# Patient Record
Sex: MALE | Race: WHITE | NOT HISPANIC OR LATINO | Employment: FULL TIME | URBAN - METROPOLITAN AREA
[De-identification: names, ages, dates, MRNs, and addresses within clinical notes are randomized per-mention and may not be internally consistent; named-entity substitution may affect disease eponyms.]

---

## 2020-12-20 ENCOUNTER — HOSPITAL ENCOUNTER (EMERGENCY)
Facility: HOSPITAL | Age: 45
Discharge: HOME | End: 2020-12-20
Attending: FAMILY MEDICINE
Payer: OTHER GOVERNMENT

## 2020-12-20 VITALS
DIASTOLIC BLOOD PRESSURE: 98 MMHG | RESPIRATION RATE: 18 BRPM | HEIGHT: 65 IN | WEIGHT: 178.25 LBS | SYSTOLIC BLOOD PRESSURE: 162 MMHG | HEART RATE: 93 BPM | OXYGEN SATURATION: 98 % | BODY MASS INDEX: 29.7 KG/M2 | TEMPERATURE: 99 F

## 2020-12-20 DIAGNOSIS — K08.89 PAIN, DENTAL: Primary | ICD-10-CM

## 2020-12-20 DIAGNOSIS — K02.9 PAIN DUE TO DENTAL CARIES: ICD-10-CM

## 2020-12-20 PROCEDURE — 99284 EMERGENCY DEPT VISIT MOD MDM: CPT

## 2020-12-20 RX ORDER — AMOXICILLIN 875 MG/1
875 TABLET, FILM COATED ORAL 2 TIMES DAILY
Qty: 14 TABLET | Refills: 0 | Status: SHIPPED | OUTPATIENT
Start: 2020-12-20 | End: 2020-12-20 | Stop reason: SDUPTHER

## 2020-12-20 RX ORDER — TRAMADOL HYDROCHLORIDE 50 MG/1
50 TABLET ORAL EVERY 6 HOURS PRN
Qty: 12 TABLET | Refills: 0 | Status: SHIPPED | OUTPATIENT
Start: 2020-12-20

## 2020-12-20 RX ORDER — IBUPROFEN 800 MG/1
800 TABLET ORAL EVERY 6 HOURS PRN
Qty: 20 TABLET | Refills: 0 | Status: SHIPPED | OUTPATIENT
Start: 2020-12-20

## 2020-12-20 RX ORDER — IBUPROFEN 800 MG/1
800 TABLET ORAL EVERY 6 HOURS PRN
Qty: 20 TABLET | Refills: 0 | Status: SHIPPED | OUTPATIENT
Start: 2020-12-20 | End: 2020-12-20 | Stop reason: SDUPTHER

## 2020-12-20 RX ORDER — TRAMADOL HYDROCHLORIDE 50 MG/1
50 TABLET ORAL EVERY 6 HOURS PRN
Qty: 12 TABLET | Refills: 0 | Status: SHIPPED | OUTPATIENT
Start: 2020-12-20 | End: 2020-12-20 | Stop reason: SDUPTHER

## 2020-12-20 RX ORDER — AMOXICILLIN 875 MG/1
875 TABLET, FILM COATED ORAL 2 TIMES DAILY
Qty: 14 TABLET | Refills: 0 | Status: SHIPPED | OUTPATIENT
Start: 2020-12-20

## 2020-12-21 NOTE — ED PROVIDER NOTES
Encounter Date: 12/20/2020       History     Chief Complaint   Patient presents with    Dental Pain     States rigth upper tooth pain for 1 week.     The history is provided by the patient.   Dental Pain  The primary symptoms include mouth pain. Primary symptoms do not include fever, shortness of breath or sore throat. The symptoms began several days ago. The symptoms are worsening. The symptoms occur constantly.   Affected locations include: teeth.   Additional symptoms include: dental sensitivity to temperature, gum swelling and gum tenderness. Additional symptoms do not include: trismus and facial swelling.     Review of patient's allergies indicates:  No Known Allergies  No past medical history on file.  No past surgical history on file.  No family history on file.  Social History     Tobacco Use    Smoking status: Not on file   Substance Use Topics    Alcohol use: Not on file    Drug use: Not on file     Review of Systems   Constitutional: Negative for fever.   HENT: Positive for dental problem. Negative for facial swelling and sore throat.    Respiratory: Negative for shortness of breath.    Cardiovascular: Negative for chest pain.   Gastrointestinal: Negative for nausea.   Genitourinary: Negative for dysuria.   Musculoskeletal: Negative for back pain.   Skin: Negative for rash.   Neurological: Negative for weakness.   Hematological: Does not bruise/bleed easily.   All other systems reviewed and are negative.      Physical Exam     Initial Vitals [12/20/20 2111]   BP Pulse Resp Temp SpO2   (!) 162/98 93 18 99 °F (37.2 °C) 98 %      MAP       --         Physical Exam    Constitutional: He appears well-developed and well-nourished. No distress.   HENT:   Head: Normocephalic and atraumatic.   Nose: Nose normal.   Mouth/Throat: Uvula is midline and oropharynx is clear and moist. No trismus in the jaw. Dental caries present. No dental abscesses.       Eyes: Conjunctivae and EOM are normal. Pupils are equal,  round, and reactive to light.   Neck: Normal range of motion. Neck supple.   Cardiovascular: Normal rate and regular rhythm.   Pulmonary/Chest: Effort normal and breath sounds normal. No respiratory distress. He has no decreased breath sounds. He has no wheezes. He has no rales.   Abdominal: Soft. Normal appearance and bowel sounds are normal. There is no abdominal tenderness.   Musculoskeletal: Normal range of motion.   Neurological: He is alert and oriented to person, place, and time. He has normal strength. GCS eye subscore is 4. GCS verbal subscore is 5. GCS motor subscore is 6.   Skin: Skin is warm and dry. Capillary refill takes less than 2 seconds. No rash noted.   Psychiatric: He has a normal mood and affect. His speech is normal and behavior is normal.         ED Course   Procedures  Labs Reviewed - No data to display       Imaging Results    None                                      Clinical Impression:       ICD-10-CM ICD-9-CM   1. Pain, dental  K08.89 525.9   2. Pain due to dental caries  K02.9 521.00                          ED Disposition Condition    Discharge Stable        ED Prescriptions     Medication Sig Dispense Start Date End Date Auth. Provider    amoxicillin (AMOXIL) 875 MG tablet  (Status: Discontinued) Take 1 tablet (875 mg total) by mouth 2 (two) times daily. 14 tablet 12/20/2020 12/20/2020 RK Emanuel Jr.    traMADoL (ULTRAM) 50 mg tablet  (Status: Discontinued) Take 1 tablet (50 mg total) by mouth every 6 (six) hours as needed. 12 tablet 12/20/2020 12/20/2020 RK Emanuel Jr.    ibuprofen (ADVIL,MOTRIN) 800 MG tablet  (Status: Discontinued) Take 1 tablet (800 mg total) by mouth every 6 (six) hours as needed for Pain. 20 tablet 12/20/2020 12/20/2020 RK Emanuel Jr.    amoxicillin (AMOXIL) 875 MG tablet  (Status: Discontinued) Take 1 tablet (875 mg total) by mouth 2 (two) times daily. 14 tablet 12/20/2020 12/20/2020 RK Emanuel Jr.     ibuprofen (ADVIL,MOTRIN) 800 MG tablet  (Status: Discontinued) Take 1 tablet (800 mg total) by mouth every 6 (six) hours as needed for Pain. 20 tablet 12/20/2020 12/20/2020 RK Emanuel Jr.    traMADoL (ULTRAM) 50 mg tablet  (Status: Discontinued) Take 1 tablet (50 mg total) by mouth every 6 (six) hours as needed. 12 tablet 12/20/2020 12/20/2020 RK Emanuel Jr.    amoxicillin (AMOXIL) 875 MG tablet Take 1 tablet (875 mg total) by mouth 2 (two) times daily. 14 tablet 12/20/2020  RK Emanuel Jr.    ibuprofen (ADVIL,MOTRIN) 800 MG tablet Take 1 tablet (800 mg total) by mouth every 6 (six) hours as needed for Pain. 20 tablet 12/20/2020  RK Emanuel Jr.    traMADoL (ULTRAM) 50 mg tablet Take 1 tablet (50 mg total) by mouth every 6 (six) hours as needed. 12 tablet 12/20/2020  RK Emanuel Jr.        Follow-up Information     Follow up With Specialties Details Why Contact Info    Dentist of your choosing  Schedule an appointment as soon as possible for a visit                                          RK Emanuel Jr.  12/21/20 2108

## 2021-08-22 ENCOUNTER — HOSPITAL ENCOUNTER (EMERGENCY)
Facility: HOSPITAL | Age: 46
Discharge: HOME | End: 2021-08-22
Attending: EMERGENCY MEDICINE
Payer: OTHER GOVERNMENT

## 2021-08-22 VITALS
OXYGEN SATURATION: 99 % | DIASTOLIC BLOOD PRESSURE: 95 MMHG | BODY MASS INDEX: 28.71 KG/M2 | SYSTOLIC BLOOD PRESSURE: 160 MMHG | TEMPERATURE: 98 F | RESPIRATION RATE: 18 BRPM | WEIGHT: 172.5 LBS | HEART RATE: 100 BPM

## 2021-08-22 DIAGNOSIS — M54.12 CERVICAL RADICULOPATHY: Primary | ICD-10-CM

## 2021-08-22 PROCEDURE — 99284 EMERGENCY DEPT VISIT MOD MDM: CPT

## 2021-08-22 RX ORDER — HYDROCODONE BITARTRATE AND ACETAMINOPHEN 10; 325 MG/1; MG/1
1 TABLET ORAL EVERY 6 HOURS PRN
Qty: 12 TABLET | Refills: 0 | Status: SHIPPED | OUTPATIENT
Start: 2021-08-22

## 2021-08-22 RX ORDER — METHYLPREDNISOLONE 4 MG/1
TABLET ORAL
Qty: 1 PACKAGE | Refills: 0 | Status: SHIPPED | OUTPATIENT
Start: 2021-08-22 | End: 2021-09-12

## 2021-08-22 RX ORDER — CYCLOBENZAPRINE HCL 10 MG
10 TABLET ORAL 3 TIMES DAILY PRN
Qty: 15 TABLET | Refills: 0 | Status: SHIPPED | OUTPATIENT
Start: 2021-08-22 | End: 2021-08-27

## 2021-10-22 ENCOUNTER — HOSPITAL ENCOUNTER (OUTPATIENT)
Dept: RADIOLOGY | Facility: HOSPITAL | Age: 46
Discharge: HOME | End: 2021-10-22
Attending: FAMILY MEDICINE
Payer: OTHER GOVERNMENT

## 2021-10-22 DIAGNOSIS — Z01.89 ENCOUNTER FOR OTHER SPECIFIED SPECIAL EXAMINATIONS: ICD-10-CM

## 2021-10-22 PROCEDURE — 72141 MRI NECK SPINE W/O DYE: CPT | Mod: TC

## 2021-11-17 ENCOUNTER — HOSPITAL ENCOUNTER (EMERGENCY)
Facility: HOSPITAL | Age: 46
Discharge: HOME | End: 2021-11-17
Attending: EMERGENCY MEDICINE
Payer: OTHER GOVERNMENT

## 2021-11-17 VITALS
BODY MASS INDEX: 29.12 KG/M2 | RESPIRATION RATE: 18 BRPM | WEIGHT: 175 LBS | HEART RATE: 108 BPM | OXYGEN SATURATION: 99 % | SYSTOLIC BLOOD PRESSURE: 174 MMHG | TEMPERATURE: 98 F | DIASTOLIC BLOOD PRESSURE: 86 MMHG

## 2021-11-17 DIAGNOSIS — L25.9 CONTACT DERMATITIS, UNSPECIFIED CONTACT DERMATITIS TYPE, UNSPECIFIED TRIGGER: Primary | ICD-10-CM

## 2021-11-17 PROCEDURE — 99283 EMERGENCY DEPT VISIT LOW MDM: CPT

## 2021-11-17 RX ORDER — PREDNISONE 20 MG/1
40 TABLET ORAL DAILY
Qty: 14 TABLET | Refills: 0 | Status: SHIPPED | OUTPATIENT
Start: 2021-11-17 | End: 2021-11-24

## 2024-11-04 ENCOUNTER — HOSPITAL ENCOUNTER (EMERGENCY)
Facility: HOSPITAL | Age: 49
Discharge: HOME | End: 2024-11-04
Attending: EMERGENCY MEDICINE
Payer: OTHER GOVERNMENT

## 2024-11-04 VITALS
HEIGHT: 65 IN | DIASTOLIC BLOOD PRESSURE: 96 MMHG | SYSTOLIC BLOOD PRESSURE: 163 MMHG | RESPIRATION RATE: 16 BRPM | HEART RATE: 89 BPM | TEMPERATURE: 98 F | OXYGEN SATURATION: 97 % | BODY MASS INDEX: 30.81 KG/M2 | WEIGHT: 184.94 LBS

## 2024-11-04 DIAGNOSIS — R51.9 NONINTRACTABLE HEADACHE, UNSPECIFIED CHRONICITY PATTERN, UNSPECIFIED HEADACHE TYPE: Primary | ICD-10-CM

## 2024-11-04 DIAGNOSIS — R42 DIZZINESS: ICD-10-CM

## 2024-11-04 DIAGNOSIS — R03.0 ELEVATED BLOOD PRESSURE READING: ICD-10-CM

## 2024-11-04 LAB
ALBUMIN SERPL BCP-MCNC: 4.1 G/DL (ref 3.5–5.2)
ALP SERPL-CCNC: 78 U/L (ref 40–150)
ALT SERPL W/O P-5'-P-CCNC: 18 U/L (ref 10–44)
ANION GAP SERPL CALC-SCNC: 10 MMOL/L (ref 8–16)
AST SERPL-CCNC: 19 U/L (ref 10–40)
BASOPHILS # BLD AUTO: 0.02 K/UL (ref 0–0.2)
BASOPHILS NFR BLD: 0.4 % (ref 0–1.9)
BILIRUB SERPL-MCNC: 0.4 MG/DL (ref 0.1–1)
BILIRUB UR QL STRIP: NEGATIVE
BUN SERPL-MCNC: 19 MG/DL (ref 6–20)
CALCIUM SERPL-MCNC: 9.6 MG/DL (ref 8.7–10.5)
CHLORIDE SERPL-SCNC: 104 MMOL/L (ref 95–110)
CLARITY UR: CLEAR
CO2 SERPL-SCNC: 27 MMOL/L (ref 23–29)
COLOR UR: COLORLESS
CREAT SERPL-MCNC: 1.2 MG/DL (ref 0.5–1.4)
DIFFERENTIAL METHOD BLD: NORMAL
EOSINOPHIL # BLD AUTO: 0 K/UL (ref 0–0.5)
EOSINOPHIL NFR BLD: 0.6 % (ref 0–8)
ERYTHROCYTE [DISTWIDTH] IN BLOOD BY AUTOMATED COUNT: 12.5 % (ref 11.5–14.5)
EST. GFR  (NO RACE VARIABLE): >60 ML/MIN/1.73 M^2
GLUCOSE SERPL-MCNC: 95 MG/DL (ref 70–110)
GLUCOSE UR QL STRIP: NEGATIVE
HCT VFR BLD AUTO: 43.3 % (ref 40–54)
HGB BLD-MCNC: 14.7 G/DL (ref 14–18)
HGB UR QL STRIP: NEGATIVE
IMM GRANULOCYTES # BLD AUTO: 0.02 K/UL (ref 0–0.04)
IMM GRANULOCYTES NFR BLD AUTO: 0.4 % (ref 0–0.5)
KETONES UR QL STRIP: NEGATIVE
LEUKOCYTE ESTERASE UR QL STRIP: NEGATIVE
LYMPHOCYTES # BLD AUTO: 1.5 K/UL (ref 1–4.8)
LYMPHOCYTES NFR BLD: 27.8 % (ref 18–48)
MCH RBC QN AUTO: 29.9 PG (ref 27–31)
MCHC RBC AUTO-ENTMCNC: 33.9 G/DL (ref 32–36)
MCV RBC AUTO: 88 FL (ref 82–98)
MONOCYTES # BLD AUTO: 0.6 K/UL (ref 0.3–1)
MONOCYTES NFR BLD: 12.2 % (ref 4–15)
NEUTROPHILS # BLD AUTO: 3.1 K/UL (ref 1.8–7.7)
NEUTROPHILS NFR BLD: 58.6 % (ref 38–73)
NITRITE UR QL STRIP: NEGATIVE
NRBC BLD-RTO: 0 /100 WBC
PH UR STRIP: 7 [PH] (ref 5–8)
PLATELET # BLD AUTO: 230 K/UL (ref 150–450)
PMV BLD AUTO: 9.9 FL (ref 9.2–12.9)
POTASSIUM SERPL-SCNC: 4.2 MMOL/L (ref 3.5–5.1)
PROT SERPL-MCNC: 7.9 G/DL (ref 6–8.4)
PROT UR QL STRIP: NEGATIVE
RBC # BLD AUTO: 4.91 M/UL (ref 4.6–6.2)
SODIUM SERPL-SCNC: 141 MMOL/L (ref 136–145)
SP GR UR STRIP: >1.03 (ref 1–1.03)
TROPONIN I SERPL DL<=0.01 NG/ML-MCNC: 0.01 NG/ML (ref 0–0.03)
URN SPEC COLLECT METH UR: ABNORMAL
UROBILINOGEN UR STRIP-ACNC: NEGATIVE EU/DL
WBC # BLD AUTO: 5.25 K/UL (ref 3.9–12.7)

## 2024-11-04 PROCEDURE — 96375 TX/PRO/DX INJ NEW DRUG ADDON: CPT

## 2024-11-04 PROCEDURE — 81003 URINALYSIS AUTO W/O SCOPE: CPT | Performed by: NURSE PRACTITIONER

## 2024-11-04 PROCEDURE — 80053 COMPREHEN METABOLIC PANEL: CPT | Performed by: NURSE PRACTITIONER

## 2024-11-04 PROCEDURE — 84484 ASSAY OF TROPONIN QUANT: CPT | Performed by: NURSE PRACTITIONER

## 2024-11-04 PROCEDURE — 63600175 PHARM REV CODE 636 W HCPCS: Performed by: EMERGENCY MEDICINE

## 2024-11-04 PROCEDURE — 99285 EMERGENCY DEPT VISIT HI MDM: CPT | Mod: 25

## 2024-11-04 PROCEDURE — 93010 ELECTROCARDIOGRAM REPORT: CPT | Mod: ,,, | Performed by: INTERNAL MEDICINE

## 2024-11-04 PROCEDURE — 85025 COMPLETE CBC W/AUTO DIFF WBC: CPT | Performed by: NURSE PRACTITIONER

## 2024-11-04 PROCEDURE — 25500020 PHARM REV CODE 255: Performed by: EMERGENCY MEDICINE

## 2024-11-04 PROCEDURE — 93005 ELECTROCARDIOGRAM TRACING: CPT

## 2024-11-04 PROCEDURE — 96374 THER/PROPH/DIAG INJ IV PUSH: CPT

## 2024-11-04 RX ORDER — BUTALBITAL, ACETAMINOPHEN AND CAFFEINE 50; 325; 40 MG/1; MG/1; MG/1
1 TABLET ORAL EVERY 4 HOURS PRN
Qty: 18 TABLET | Refills: 0 | Status: SHIPPED | OUTPATIENT
Start: 2024-11-04

## 2024-11-04 RX ORDER — DIPHENHYDRAMINE HYDROCHLORIDE 50 MG/ML
25 INJECTION INTRAMUSCULAR; INTRAVENOUS
Status: COMPLETED | OUTPATIENT
Start: 2024-11-04 | End: 2024-11-04

## 2024-11-04 RX ORDER — PROCHLORPERAZINE EDISYLATE 5 MG/ML
10 INJECTION INTRAMUSCULAR; INTRAVENOUS ONCE
Status: COMPLETED | OUTPATIENT
Start: 2024-11-04 | End: 2024-11-04

## 2024-11-04 RX ADMIN — IOHEXOL 100 ML: 350 INJECTION, SOLUTION INTRAVENOUS at 05:11

## 2024-11-04 RX ADMIN — DIPHENHYDRAMINE HYDROCHLORIDE 25 MG: 50 INJECTION INTRAMUSCULAR; INTRAVENOUS at 04:11

## 2024-11-04 RX ADMIN — PROCHLORPERAZINE EDISYLATE 10 MG: 5 INJECTION INTRAMUSCULAR; INTRAVENOUS at 05:11

## 2024-11-04 NOTE — ED PROVIDER NOTES
SCRIBE #1 NOTE: I, Wellington Caruso, am scribing for, and in the presence of, Daniel Del Cid DO. I have scribed the entire note.       History     Chief Complaint   Patient presents with    Headache     Sharp pain in head with dizziness and nausea x 2 days  Hx of migraines     Review of patient's allergies indicates:   Allergen Reactions    Nsaids (non-steroidal anti-inflammatory drug)          History of Present Illness     HPI    11/4/2024, 4:26 PM  History obtained from the patient      History of Present Illness: Harvey Berger is a 48 y.o. male patient with no PMHx who presents to the Emergency Department for evaluation of headache which onset 1 day ago. Pt states that yesterday at around 1 PM he experienced a sudden episodic sharp headache pain to the right posterior scalp. The pain happened again today at 2 PM. Pt states that the headache was followed with nausea, dizziness, and sweating. Pt states that he feels better right now, but is tired and still dizzy. Pt states he has chronic headaches and does not take any medicines for them. Symptoms are constant and moderate in severity. No mitigating or exacerbating factors reported. Associated sxs include nausea, dizziness, and sweating. Patient denies any CP, chest tightness, SOB, chest pressure, and all other sxs at this time. No Prior Tx. No further complaints or concerns at this time.       Arrival mode: Personal vehicle     PCP: Jd Galeano MD        Past Medical History:  History reviewed. No pertinent past medical history.    Past Surgical History:  History reviewed. No pertinent surgical history.      Family History:  No family history on file.    Social History:  Social History     Tobacco Use    Smoking status: Never    Smokeless tobacco: Never   Substance and Sexual Activity    Alcohol use: Never    Drug use: Never    Sexual activity: Yes        Review of Systems     Review of Systems   Constitutional:  Positive for diaphoresis.  "  Respiratory:  Negative for chest tightness and shortness of breath.    Cardiovascular:  Negative for chest pain.   Gastrointestinal:  Positive for nausea.   Neurological:  Positive for dizziness (while sitting as well) and headaches (episodic right posterior scalp).        Physical Exam     Initial Vitals [11/04/24 1603]   BP Pulse Resp Temp SpO2   (!) 189/114 104 18 98.3 °F (36.8 °C) 98 %      MAP       --          Physical Exam  Vitals reviewed.   Constitutional:       Appearance: Normal appearance.   Eyes:      Extraocular Movements: Extraocular movements intact.      Pupils: Pupils are equal, round, and reactive to light.   Cardiovascular:      Rate and Rhythm: Normal rate and regular rhythm.      Heart sounds: No murmur heard.  Pulmonary:      Effort: Pulmonary effort is normal.      Breath sounds: No wheezing.   Musculoskeletal:         General: Normal range of motion.   Skin:     General: Skin is warm and dry.      Findings: No rash.   Neurological:      General: No focal deficit present.      Mental Status: He is alert and oriented to person, place, and time.              ED Course   Procedures  ED Vital Signs:  Vitals:    11/04/24 1603 11/04/24 1657 11/04/24 1730   BP: (!) 189/114  (!) 163/96   Pulse: 104 92 89   Resp: 18  16   Temp: 98.3 °F (36.8 °C)     TempSrc: Oral     SpO2: 98%  97%   Weight: 83.9 kg (184 lb 15.5 oz)     Height: 5' 5" (1.651 m)         Abnormal Lab Results:  Labs Reviewed   URINALYSIS, REFLEX TO URINE CULTURE - Abnormal       Result Value    Specimen UA Urine, Clean Catch      Color, UA Colorless (*)     Appearance, UA Clear      pH, UA 7.0      Specific Gravity, UA >1.030 (*)     Protein, UA Negative      Glucose, UA Negative      Ketones, UA Negative      Bilirubin (UA) Negative      Occult Blood UA Negative      Nitrite, UA Negative      Urobilinogen, UA Negative      Leukocytes, UA Negative      Narrative:     Specimen Source->Urine   CBC W/ AUTO DIFFERENTIAL    WBC 5.25      RBC " 4.91      Hemoglobin 14.7      Hematocrit 43.3      MCV 88      MCH 29.9      MCHC 33.9      RDW 12.5      Platelets 230      MPV 9.9      Immature Granulocytes 0.4      Gran # (ANC) 3.1      Immature Grans (Abs) 0.02      Lymph # 1.5      Mono # 0.6      Eos # 0.0      Baso # 0.02      nRBC 0      Gran % 58.6      Lymph % 27.8      Mono % 12.2      Eosinophil % 0.6      Basophil % 0.4      Differential Method Automated     COMPREHENSIVE METABOLIC PANEL    Sodium 141      Potassium 4.2      Chloride 104      CO2 27      Glucose 95      BUN 19      Creatinine 1.2      Calcium 9.6      Total Protein 7.9      Albumin 4.1      Total Bilirubin 0.4      Alkaline Phosphatase 78      AST 19      ALT 18      eGFR >60      Anion Gap 10     TROPONIN I    Troponin I 0.010          All Lab Results:  Results for orders placed or performed during the hospital encounter of 11/04/24   EKG 12-lead    Collection Time: 11/04/24  4:26 PM   Result Value Ref Range    QRS Duration 84 ms    [facility] QTC Calculation 425 ms   CBC auto differential    Collection Time: 11/04/24  4:55 PM   Result Value Ref Range    WBC 5.25 3.90 - 12.70 K/uL    RBC 4.91 4.60 - 6.20 M/uL    Hemoglobin 14.7 14.0 - 18.0 g/dL    Hematocrit 43.3 40.0 - 54.0 %    MCV 88 82 - 98 fL    MCH 29.9 27.0 - 31.0 pg    MCHC 33.9 32.0 - 36.0 g/dL    RDW 12.5 11.5 - 14.5 %    Platelets 230 150 - 450 K/uL    MPV 9.9 9.2 - 12.9 fL    Immature Granulocytes 0.4 0.0 - 0.5 %    Gran # (ANC) 3.1 1.8 - 7.7 K/uL    Immature Grans (Abs) 0.02 0.00 - 0.04 K/uL    Lymph # 1.5 1.0 - 4.8 K/uL    Mono # 0.6 0.3 - 1.0 K/uL    Eos # 0.0 0.0 - 0.5 K/uL    Baso # 0.02 0.00 - 0.20 K/uL    nRBC 0 0 /100 WBC    Gran % 58.6 38.0 - 73.0 %    Lymph % 27.8 18.0 - 48.0 %    Mono % 12.2 4.0 - 15.0 %    Eosinophil % 0.6 0.0 - 8.0 %    Basophil % 0.4 0.0 - 1.9 %    Differential Method Automated    Comprehensive metabolic panel    Collection Time: 11/04/24  4:55 PM   Result Value Ref Range    Sodium 141 136 - 145  mmol/L    Potassium 4.2 3.5 - 5.1 mmol/L    Chloride 104 95 - 110 mmol/L    CO2 27 23 - 29 mmol/L    Glucose 95 70 - 110 mg/dL    BUN 19 6 - 20 mg/dL    Creatinine 1.2 0.5 - 1.4 mg/dL    Calcium 9.6 8.7 - 10.5 mg/dL    Total Protein 7.9 6.0 - 8.4 g/dL    Albumin 4.1 3.5 - 5.2 g/dL    Total Bilirubin 0.4 0.1 - 1.0 mg/dL    Alkaline Phosphatase 78 40 - 150 U/L    AST 19 10 - 40 U/L    ALT 18 10 - 44 U/L    eGFR >60 >60 mL/min/1.73 m^2    Anion Gap 10 8 - 16 mmol/L   Troponin I    Collection Time: 11/04/24  4:55 PM   Result Value Ref Range    Troponin I 0.010 0.000 - 0.026 ng/mL   Urinalysis, Reflex to Urine Culture Urine, Clean Catch    Collection Time: 11/04/24  6:26 PM    Specimen: Urine, Clean Catch   Result Value Ref Range    Specimen UA Urine, Clean Catch     Color, UA Colorless (A) Yellow, Straw, Kate    Appearance, UA Clear Clear    pH, UA 7.0 5.0 - 8.0    Specific Gravity, UA >1.030 (A) 1.005 - 1.030    Protein, UA Negative Negative    Glucose, UA Negative Negative    Ketones, UA Negative Negative    Bilirubin (UA) Negative Negative    Occult Blood UA Negative Negative    Nitrite, UA Negative Negative    Urobilinogen, UA Negative <2.0 EU/dL    Leukocytes, UA Negative Negative        Imaging Results:  Imaging Results              MRI Brain Without Contrast (Final result)  Result time 11/04/24 19:55:58      Final result by Gigi Dockery MD (11/04/24 19:55:58)                   Impression:      No acute infarct..  Few foci of punctate FLAIR hyperintensity may relate to chronic microvascular ischemic changes.      Electronically signed by: Gigi Dockery  Date:    11/04/2024  Time:    19:55               Narrative:    EXAMINATION:  MRI BRAIN WITHOUT CONTRAST    CLINICAL HISTORY:  Dizziness, persistent/recurrent, cardiac or vascular cause suspected;    TECHNIQUE:  Sagittal T1. Axial T1, T2, T2 FLAIR, GRE, DWI. Coronal T2 FLAIR.    COMPARISON:  None available.    FINDINGS:  There is no restricted diffusion. Few  foci of punctate FLAIR hyperintensity may relate to chronic microvascular ischemic changes.    The ventricles and sulci are normal in size and configuration. Midline structures are normal. There are preserved arterial flow-voids on T2 weighted imaging. The paranasal sinuses and mastoid air cells are clear.    No abnormal marrow signal is identified.                                       CTA Head and Neck (xpd) (Final result)  Result time 11/04/24 18:08:30      Final result by Gigi Dockery MD (11/04/24 18:08:30)                   Impression:      Negative for hemodynamically significant stenosis of the major arterial vasculature of the head and neck.    Moderate opacification of the maxillary sinus. Mildly prominent reactive lymph nodes are identified.  Small lymph node in the left parotid gland.    All CT scans at this facility are performed  using dose modulation techniques as appropriate to performed exam including the following:  automated exposure control; adjustment of mA and/or kV according to the patients size (this includes techniques or standardized protocols for targeted exams where dose is matched to indication/reason for exam: i.e. extremities or head);  iterative reconstruction technique.      Electronically signed by: Gigi Dockery  Date:    11/04/2024  Time:    18:08               Narrative:    EXAMINATION:  CTA HEAD AND NECK (XPD)    CLINICAL HISTORY:  Dizziness, persistent/recurrent, cardiac or vascular cause suspected;    TECHNIQUE:  Standard contrast enhanced CTA of brain with 100ml of Omnipaque 350 with 3D MIP reformations. No previous for comparison.    COMPARISON:  None    FINDINGS:  CTA head:    The brain is without acute process    The anterior circulation is without hemodynamically significant stenosis the right and left internal carotid arteries are without hemodynamically significant l.    The vertebrobasilar arteries are without hemodynamically significant.    CTA neck:    The aortic  arch appears without hemodynamically significant .  The great vessel origins from the aortic arch appear without hemodynamically significant.    The right common carotid artery appears normal.  The carotid bifurcation and right internal carotid artery appears normal.    The left common carotid artery appears without hemodynamically significant.  The carotid bifurcation and left internal carotid artery appears without hemodynamically significant.    The right vertebral artery appears without hemodynamically significant .    The left vertebral artery appears without hemodynamically significant.  Moderate opacification of the maxillary sinus.  Mildly prominent reactive lymph nodes are identified.  Small lymph node in the left parotid gland.                                       X-Ray Chest 1 View (Final result)  Result time 11/04/24 16:48:53      Final result by Jorje Salcedo MD (11/04/24 16:48:53)                   Impression:      1.  Negative for acute process involving the chest.    2.  Stable findings as noted above.      Electronically signed by: Jorje Salcedo MD  Date:    11/04/2024  Time:    16:48               Narrative:    EXAMINATION:  XR CHEST 1 VIEW    CLINICAL HISTORY:  Elevated blood-pressure reading, without diagnosis of hypertension    COMPARISON:  November 8, 2010    FINDINGS:  The study is lordotic in position.  The lungs are clear. The cardiac silhouette size is normal. The trachea is midline and the mediastinal width is normal. Negative for focal infiltrate, effusion or pneumothorax. Pulmonary vasculature is normal. Negative for osseous abnormalities. Tortuous aorta.  Marginal spondylosis.  Cardiophrenic fat pads.                                       CT Head Without Contrast (Final result)  Result time 11/04/24 16:26:04      Final result by Gigi Dockery MD (11/04/24 16:26:04)                   Impression:      Mild mucosal thickening of the maxillary sinuses.  No intracranial hemorrhage mass  effect or midline shift.    All CT scans   are performed using dose optimization techniques including the following: automated exposure control; adjustment of the mA and/or kV; use of iterative reconstruction technique.  Dose modulation was employed for ALARA by means of: Automated exposure control; adjustment of the mA and/or kV according to patient size (this includes techniques or standardized protocols for targeted exams where dose is matched to indication/reason for exam; i.e. extremities or head); and/or use of iterative reconstructive technique.      Electronically signed by: Gigi Dockery  Date:    11/04/2024  Time:    16:26               Narrative:    EXAMINATION:  CT HEAD WITHOUT CONTRAST    CLINICAL HISTORY:  Headache, chronic, new features or increased frequency;    TECHNIQUE:  Low dose axial CT images obtained throughout the head without intravenous contrast. Sagittal and coronal reconstructions were performed.    COMPARISON:  None.    FINDINGS:  Intracranial compartment:    Ventricles and sulci are normal in size for age without evidence of hydrocephalus. No extra-axial blood or fluid collections.    No parenchymal mass, hemorrhage, edema or major vascular distribution infarct.    Skull/extracranial contents (limited evaluation): No fracture. Mild mucosal thickening of the maxillary sinuses.                                       The EKG was ordered, reviewed, and independently interpreted by the ED provider.  Interpretation time: 16:26  Rate: 88 BPM  Rhythm: normal sinus rhythm  Interpretation: Non specific T waves. No STEMI.    The Emergency Provider reviewed the vital signs and test results, which are outlined above.     ED Discussion       8:33 PM: Reassessed pt at this time. Discussed with pt all pertinent ED information and results. Discussed pt dx and plan of tx. Gave pt all f/u and return to the ED instructions. All questions and concerns were addressed at this time. Pt expresses understanding  of information and instructions, and is comfortable with plan to discharge. Pt is stable for discharge.    I discussed with patient and/or family/caretaker that evaluation in the ED does not suggest any emergent or life threatening medical conditions requiring immediate intervention beyond what was provided in the ED, and I believe patient is safe for discharge.  Regardless, an unremarkable evaluation in the ED does not preclude the development or presence of a serious of life threatening condition. As such, patient was instructed to return immediately for any worsening or change in current symptoms.     ED Course as of 11/05/24 2001 Mon Nov 04, 2024   1645 CT Head Without Contrast  Mild mucosal thickening of the maxillary sinuses.  No intracranial hemorrhage mass effect or midline shift. [CD]   2021 Urinalysis, Reflex to Urine Culture Urine, Clean Catch(!)  No UTI [CD]   2021 CBC auto differential  Within normal limits [CD]   2021 Troponin I  Within normal limits [CD]   2021 Comprehensive metabolic panel  Within normal limits [CD]   2023 MRI Brain Without Contrast  No acute infarct..  Few foci of punctate FLAIR hyperintensity may relate to chronic microvascular ischemic changes. [CD]   2023 CTA Head and Neck (xpd)  Negative for hemodynamically significant stenosis of the major arterial vasculature of the head and neck.     Moderate opacification of the maxillary sinus. Mildly prominent reactive lymph nodes are identified.  Small lymph node in the left parotid gland.   [CD]   2023 X-Ray Chest 1 View  The study is lordotic in position.  The lungs are clear. The cardiac silhouette size is normal. The trachea is midline and the mediastinal width is normal. Negative for focal infiltrate, effusion or pneumothorax. Pulmonary vasculature is normal. Negative for osseous abnormalities. Tortuous aorta.  Marginal spondylosis.  Cardiophrenic fat pads. [CD]      ED Course User Index  [CD] Daniel Del Cid, DO     Medical  Decision Making  Headache has resolved.  Although the symptoms have resolved.  Patient able to tolerate fluids and ambulate around the department with no assistance.  Instructed to return immediately for any new or worsening symptoms and he verbalized understanding.    Amount and/or Complexity of Data Reviewed  Labs: ordered. Decision-making details documented in ED Course.  Radiology: ordered. Decision-making details documented in ED Course.  ECG/medicine tests: ordered and independent interpretation performed. Decision-making details documented in ED Course.    Risk  Prescription drug management.  Risk Details: Differential diagnosis includes but is not limited to:  Posterior CVA, CVA, central vertigo, peripheral vertigo, migraine headache, tension headache                ED Medication(s):  Medications   prochlorperazine injection Soln 10 mg (10 mg Intravenous Given 11/4/24 1701)   diphenhydrAMINE injection 25 mg (25 mg Intravenous Given 11/4/24 1658)   iohexoL (OMNIPAQUE 350) injection 100 mL (100 mLs Intravenous Given 11/4/24 1756)       Discharge Medication List as of 11/4/2024  8:33 PM        START taking these medications    Details   butalbital-acetaminophen-caffeine -40 mg (FIORICET, ESGIC) -40 mg per tablet Take 1 tablet by mouth every 4 (four) hours as needed for Headaches., Starting Mon 11/4/2024, Print              Follow-up Information       Schedule an appointment as soon as possible for a visit  with Jd Galeano MD.    Specialty: Family Medicine  Contact information:  [id number]  [place] [address]  [phone]                                 Scribe Attestation:   Scribe #1: I performed the above scribed service and the documentation accurately describes the services I performed. I attest to the accuracy of the note.     Attending:   Physician Attestation Statement for Scribe #1: INehemias Christopher B., DO, personally performed the services described in this  documentation, as scribed by Wellington Caruso, in my presence, and it is both accurate and complete.           Clinical Impression       ICD-10-CM ICD-9-CM   1. Nonintractable headache, unspecified chronicity pattern, unspecified headache type  R51.9 784.0   2. Elevated blood pressure reading  R03.0 796.2   3. Dizziness  R42 780.4       Disposition:   Disposition: Discharged  Condition: Stable        Daniel Del Cid,   11/05/24 2008

## 2024-11-04 NOTE — FIRST PROVIDER EVALUATION
Medical screening examination initiated.  I have conducted a focused provider triage encounter, findings are as follows:    Brief history of present illness:  Pt. C/o 2 days of headache and severe dizziness    There were no vitals filed for this visit.    Pertinent physical exam:  Pt. Speaking slowly, FAST negative,     Brief workup plan:  labs imaging, MD to see    Preliminary workup initiated; this workup will be continued and followed by the physician or advanced practice provider that is assigned to the patient when roomed.

## 2024-11-05 LAB
OHS QRS DURATION: 84 MS
OHS QTC CALCULATION: 425 MS